# Patient Record
Sex: FEMALE | Race: WHITE | URBAN - METROPOLITAN AREA
[De-identification: names, ages, dates, MRNs, and addresses within clinical notes are randomized per-mention and may not be internally consistent; named-entity substitution may affect disease eponyms.]

---

## 2017-06-30 ENCOUNTER — HISTORICAL (OUTPATIENT)
Dept: LAB | Facility: HOSPITAL | Age: 22
End: 2017-06-30

## 2017-07-21 ENCOUNTER — HOSPITAL ENCOUNTER (OUTPATIENT)
Dept: MEDSURG UNIT | Facility: HOSPITAL | Age: 22
End: 2017-07-22

## 2017-07-21 LAB
ABS NEUT (OLG): 8.43 X10(3)/MCL (ref 2.1–9.2)
ALBUMIN SERPL-MCNC: 3.5 GM/DL (ref 3.4–5)
ALBUMIN/GLOB SERPL: 1 {RATIO}
ALP SERPL-CCNC: 87 UNIT/L (ref 38–126)
ALT SERPL-CCNC: 20 UNIT/L (ref 12–78)
APPEARANCE, UA: CLEAR
AST SERPL-CCNC: 14 UNIT/L (ref 15–37)
BACTERIA SPEC CULT: NORMAL /HPF
BASOPHILS # BLD AUTO: 0.1 X10(3)/MCL (ref 0–0.2)
BASOPHILS NFR BLD AUTO: 0 %
BILIRUB SERPL-MCNC: 0.4 MG/DL (ref 0.2–1)
BILIRUB UR QL STRIP: NEGATIVE
BILIRUBIN DIRECT+TOT PNL SERPL-MCNC: 0.1 MG/DL (ref 0–0.2)
BILIRUBIN DIRECT+TOT PNL SERPL-MCNC: 0.3 MG/DL (ref 0–0.8)
BUN SERPL-MCNC: 10 MG/DL (ref 7–18)
CALCIUM SERPL-MCNC: 8.8 MG/DL (ref 8.5–10.1)
CHLORIDE SERPL-SCNC: 108 MMOL/L (ref 98–107)
CO2 SERPL-SCNC: 22 MMOL/L (ref 21–32)
COLOR UR: YELLOW
CREAT SERPL-MCNC: 0.74 MG/DL (ref 0.55–1.02)
ERYTHROCYTE [DISTWIDTH] IN BLOOD BY AUTOMATED COUNT: 14.9 % (ref 11.5–17)
GLOBULIN SER-MCNC: 3.5 GM/DL (ref 2.4–3.5)
GLUCOSE (UA): NEGATIVE
GLUCOSE SERPL-MCNC: 84 MG/DL (ref 74–106)
HCT VFR BLD AUTO: 35.8 % (ref 37–47)
HGB BLD-MCNC: 11.2 GM/DL (ref 12–16)
HGB UR QL STRIP: NEGATIVE
KETONES UR QL STRIP: NEGATIVE
LEUKOCYTE ESTERASE UR QL STRIP: NEGATIVE
LIPASE SERPL-CCNC: 104 UNIT/L (ref 73–393)
LYMPHOCYTES # BLD AUTO: 2.8 X10(3)/MCL (ref 0.6–4.6)
LYMPHOCYTES NFR BLD AUTO: 23 %
MCH RBC QN AUTO: 23.6 PG (ref 27–31)
MCHC RBC AUTO-ENTMCNC: 31.3 GM/DL (ref 33–36)
MCV RBC AUTO: 75.4 FL (ref 80–94)
MONOCYTES # BLD AUTO: 0.8 X10(3)/MCL (ref 0.1–1.3)
MONOCYTES NFR BLD AUTO: 6 %
NEUTROPHILS # BLD AUTO: 8.43 X10(3)/MCL (ref 1.4–7.9)
NEUTROPHILS NFR BLD AUTO: 70 %
NITRITE UR QL STRIP: NEGATIVE
PH UR STRIP: 5.5 [PH] (ref 5–9)
PLATELET # BLD AUTO: 313 X10(3)/MCL (ref 130–400)
PMV BLD AUTO: 10 FL (ref 9.4–12.4)
POTASSIUM SERPL-SCNC: 3.9 MMOL/L (ref 3.5–5.1)
PROT SERPL-MCNC: 7 GM/DL (ref 6.4–8.2)
PROT UR QL STRIP: NEGATIVE
RBC # BLD AUTO: 4.75 X10(6)/MCL (ref 4.2–5.4)
RBC #/AREA URNS HPF: NORMAL /[HPF]
SODIUM SERPL-SCNC: 140 MMOL/L (ref 136–145)
SP GR UR STRIP: 1.01 (ref 1–1.03)
SQUAMOUS EPITHELIAL, UA: NORMAL
UROBILINOGEN UR STRIP-ACNC: 0.2
WBC # SPEC AUTO: 12.1 X10(3)/MCL (ref 4.5–11.5)
WBC #/AREA URNS HPF: NORMAL /[HPF]

## 2017-07-22 LAB
ABS NEUT (OLG): 7.45 X10(3)/MCL (ref 2.1–9.2)
ALBUMIN SERPL-MCNC: 3.1 GM/DL (ref 3.4–5)
ALBUMIN/GLOB SERPL: 0.8 RATIO (ref 1.1–2)
ALP SERPL-CCNC: 87 UNIT/L (ref 38–126)
ALT SERPL-CCNC: 17 UNIT/L (ref 12–78)
AST SERPL-CCNC: 14 UNIT/L (ref 15–37)
BASOPHILS # BLD AUTO: 0 X10(3)/MCL (ref 0–0.2)
BASOPHILS NFR BLD AUTO: 0 %
BILIRUB SERPL-MCNC: 0.3 MG/DL (ref 0.2–1)
BILIRUBIN DIRECT+TOT PNL SERPL-MCNC: 0.1 MG/DL (ref 0–0.5)
BILIRUBIN DIRECT+TOT PNL SERPL-MCNC: 0.2 MG/DL (ref 0–0.8)
BUN SERPL-MCNC: 7 MG/DL (ref 7–18)
CALCIUM SERPL-MCNC: 8.6 MG/DL (ref 8.5–10.1)
CHLORIDE SERPL-SCNC: 108 MMOL/L (ref 98–107)
CO2 SERPL-SCNC: 21 MMOL/L (ref 21–32)
CREAT SERPL-MCNC: 0.82 MG/DL (ref 0.55–1.02)
ERYTHROCYTE [DISTWIDTH] IN BLOOD BY AUTOMATED COUNT: 15.1 % (ref 11.5–17)
GLOBULIN SER-MCNC: 3.9 GM/DL (ref 2.4–3.5)
GLUCOSE SERPL-MCNC: 129 MG/DL (ref 74–106)
HCT VFR BLD AUTO: 35.7 % (ref 37–47)
HGB BLD-MCNC: 10.8 GM/DL (ref 12–16)
LYMPHOCYTES # BLD AUTO: 1.4 X10(3)/MCL (ref 0.6–4.6)
LYMPHOCYTES NFR BLD AUTO: 14 %
MCH RBC QN AUTO: 23.4 PG (ref 27–31)
MCHC RBC AUTO-ENTMCNC: 30.3 GM/DL (ref 33–36)
MCV RBC AUTO: 77.3 FL (ref 80–94)
MONOCYTES # BLD AUTO: 0.5 X10(3)/MCL (ref 0.1–1.3)
MONOCYTES NFR BLD AUTO: 5 %
NEUTROPHILS # BLD AUTO: 7.45 X10(3)/MCL (ref 2.1–9.2)
NEUTROPHILS NFR BLD AUTO: 80 %
PLATELET # BLD AUTO: 345 X10(3)/MCL (ref 130–400)
PMV BLD AUTO: 10.2 FL (ref 9.4–12.4)
POTASSIUM SERPL-SCNC: 4.1 MMOL/L (ref 3.5–5.1)
PROT SERPL-MCNC: 7 GM/DL (ref 6.4–8.2)
RBC # BLD AUTO: 4.62 X10(6)/MCL (ref 4.2–5.4)
SODIUM SERPL-SCNC: 141 MMOL/L (ref 136–145)
WBC # SPEC AUTO: 9.4 X10(3)/MCL (ref 4.5–11.5)

## 2017-07-27 LAB
FINAL CULTURE: NORMAL
FINAL CULTURE: NORMAL

## 2017-11-15 ENCOUNTER — HISTORICAL (OUTPATIENT)
Dept: LAB | Facility: HOSPITAL | Age: 22
End: 2017-11-15

## 2017-11-15 LAB
AMYLASE SERPL-CCNC: 38 UNIT/L (ref 25–115)
LIPASE SERPL-CCNC: 116 UNIT/L (ref 73–393)

## 2018-09-14 ENCOUNTER — HISTORICAL (OUTPATIENT)
Dept: ADMINISTRATIVE | Facility: HOSPITAL | Age: 23
End: 2018-09-14

## 2018-09-14 LAB
ABS NEUT (OLG): 3.5
ALBUMIN SERPL-MCNC: 4 GM/DL (ref 3.4–5)
ALBUMIN/GLOB SERPL: 1.38 {RATIO} (ref 1.5–2.5)
ALP SERPL-CCNC: 63 UNIT/L (ref 38–126)
ALT SERPL-CCNC: 9 UNIT/L (ref 7–52)
AST SERPL-CCNC: 14 UNIT/L (ref 15–37)
BILIRUB SERPL-MCNC: 0.3 MG/DL (ref 0.2–1)
BILIRUBIN DIRECT+TOT PNL SERPL-MCNC: 0.1 MG/DL (ref 0–0.5)
BILIRUBIN DIRECT+TOT PNL SERPL-MCNC: 0.2 MG/DL
BUN SERPL-MCNC: 11 MG/DL (ref 7–18)
CALCIUM SERPL-MCNC: 9 MG/DL (ref 8.5–10)
CHLORIDE SERPL-SCNC: 106 MMOL/L (ref 98–107)
CHOLEST SERPL-MCNC: 193 MG/DL (ref 0–200)
CHOLEST/HDLC SERPL: 3.9 {RATIO}
CO2 SERPL-SCNC: 23 MMOL/L (ref 21–32)
CREAT SERPL-MCNC: 0.8 MG/DL (ref 0.6–1.3)
ERYTHROCYTE [DISTWIDTH] IN BLOOD BY AUTOMATED COUNT: 12.8 % (ref 11.5–17)
GLOBULIN SER-MCNC: 2.8 GM/DL (ref 1.2–3)
GLUCOSE SERPL-MCNC: 92 MG/DL (ref 74–106)
HCT VFR BLD AUTO: 38.6 % (ref 37–47)
HDLC SERPL-MCNC: 50 MG/DL (ref 35–60)
HGB BLD-MCNC: 12.1 GM/DL (ref 12–16)
LDLC SERPL CALC-MCNC: 148 MG/DL (ref 0–129)
LYMPHOCYTES # BLD AUTO: 3 X10(3)/MCL (ref 0.6–3.4)
LYMPHOCYTES NFR BLD AUTO: 41.6 % (ref 13–40)
MCH RBC QN AUTO: 26.9 PG (ref 27–31.2)
MCHC RBC AUTO-ENTMCNC: 31 GM/DL (ref 32–36)
MCV RBC AUTO: 86 FL (ref 80–94)
MONOCYTES # BLD AUTO: 0.7 X10(3)/MCL (ref 0–1.8)
MONOCYTES NFR BLD AUTO: 9.8 % (ref 0.1–24)
NEUTROPHILS NFR BLD AUTO: 48.6 % (ref 47–80)
PLATELET # BLD AUTO: 340 X10(3)/MCL (ref 130–400)
PMV BLD AUTO: 8.8 FL
POTASSIUM SERPL-SCNC: 4 MMOL/L (ref 3.5–5.1)
PROT SERPL-MCNC: 6.9 GM/DL (ref 6.4–8.2)
RBC # BLD AUTO: 4.5 X10(6)/MCL (ref 4.2–5.4)
SODIUM SERPL-SCNC: 137 MMOL/L (ref 136–145)
TRIGL SERPL-MCNC: 72 MG/DL (ref 30–150)
TSH SERPL-ACNC: 0.94 MIU/ML (ref 0.35–4.94)
VLDLC SERPL CALC-MCNC: 14.4 MG/DL
WBC # SPEC AUTO: 7.2 X10(3)/MCL (ref 4.5–11.5)

## 2019-01-02 ENCOUNTER — HISTORICAL (OUTPATIENT)
Dept: ADMINISTRATIVE | Facility: HOSPITAL | Age: 24
End: 2019-01-02

## 2019-01-02 LAB
ALBUMIN SERPL-MCNC: 4.1 GM/DL (ref 3.4–5)
ALBUMIN/GLOB SERPL: 1.41 {RATIO} (ref 1.5–2.5)
ALP SERPL-CCNC: 70 UNIT/L (ref 38–126)
ALT SERPL-CCNC: 10 UNIT/L (ref 7–52)
AST SERPL-CCNC: 13 UNIT/L (ref 15–37)
BILIRUB SERPL-MCNC: 0.4 MG/DL (ref 0.2–1)
BILIRUBIN DIRECT+TOT PNL SERPL-MCNC: 0.1 MG/DL (ref 0–0.5)
BILIRUBIN DIRECT+TOT PNL SERPL-MCNC: 0.3 MG/DL
BUN SERPL-MCNC: 13 MG/DL (ref 7–18)
CALCIUM SERPL-MCNC: 8.8 MG/DL (ref 8.5–10)
CHLORIDE SERPL-SCNC: 101 MMOL/L (ref 98–107)
CK SERPL-CCNC: 63 UNIT/L (ref 21–232)
CO2 SERPL-SCNC: 26 MMOL/L (ref 21–32)
CREAT SERPL-MCNC: 0.66 MG/DL (ref 0.6–1.3)
GLOBULIN SER-MCNC: 2.9 GM/DL (ref 1.2–3)
GLUCOSE SERPL-MCNC: 94 MG/DL (ref 74–106)
POTASSIUM SERPL-SCNC: 4.1 MMOL/L (ref 3.5–5.1)
PROT SERPL-MCNC: 7 GM/DL (ref 6.4–8.2)
SODIUM SERPL-SCNC: 134 MMOL/L (ref 136–145)

## 2019-06-21 ENCOUNTER — HISTORICAL (OUTPATIENT)
Dept: LAB | Facility: HOSPITAL | Age: 24
End: 2019-06-21

## 2019-06-21 ENCOUNTER — HISTORICAL (OUTPATIENT)
Dept: ADMINISTRATIVE | Facility: HOSPITAL | Age: 24
End: 2019-06-21

## 2019-06-21 LAB
ABS NEUT (OLG): 3 X10(3)/MCL (ref 2.1–9.2)
DEPRECATED CALCIDIOL+CALCIFEROL SERPL-MC: 21.1 NG/ML (ref 30–80)
ERYTHROCYTE [DISTWIDTH] IN BLOOD BY AUTOMATED COUNT: 13 % (ref 11.5–17)
HCT VFR BLD AUTO: 38.3 % (ref 37–47)
HGB BLD-MCNC: 12.5 GM/DL (ref 12–16)
LYMPHOCYTES # BLD AUTO: 3 X10(3)/MCL (ref 0.6–3.4)
LYMPHOCYTES NFR BLD AUTO: 44.9 % (ref 13–40)
MCH RBC QN AUTO: 26.7 PG (ref 27–31.2)
MCHC RBC AUTO-ENTMCNC: 33 GM/DL (ref 32–36)
MCV RBC AUTO: 82 FL (ref 80–94)
MONOCYTES # BLD AUTO: 0.6 X10(3)/MCL (ref 0.1–1.3)
MONOCYTES NFR BLD AUTO: 9.1 % (ref 0.1–24)
NEUTROPHILS NFR BLD AUTO: 46 % (ref 47–80)
PLATELET # BLD AUTO: 326 X10(3)/MCL (ref 130–400)
PMV BLD AUTO: 9.2 FL (ref 9.4–12.4)
RBC # BLD AUTO: 4.68 X10(6)/MCL (ref 4.2–5.4)
VIT B12 SERPL-MCNC: 358 PG/ML (ref 193–986)
WBC # SPEC AUTO: 6.6 X10(3)/MCL (ref 4.5–11.5)

## 2020-01-07 ENCOUNTER — HISTORICAL (OUTPATIENT)
Dept: ADMINISTRATIVE | Facility: HOSPITAL | Age: 25
End: 2020-01-07

## 2020-01-07 LAB
ABS NEUT (OLG): 4.5 X10(3)/MCL (ref 2.1–9.2)
ALBUMIN SERPL-MCNC: 3.9 GM/DL (ref 3.4–5)
ALBUMIN/GLOB SERPL: 1.34 {RATIO} (ref 1.5–2.5)
ALP SERPL-CCNC: 64 UNIT/L (ref 38–126)
ALT SERPL-CCNC: 19 UNIT/L (ref 7–52)
APPEARANCE, UA: CLEAR
AST SERPL-CCNC: 19 UNIT/L (ref 15–37)
BACTERIA #/AREA URNS AUTO: ABNORMAL /HPF
BILIRUB SERPL-MCNC: 0.3 MG/DL (ref 0.2–1)
BILIRUB UR QL STRIP: NEGATIVE MG/DL
BILIRUBIN DIRECT+TOT PNL SERPL-MCNC: 0 MG/DL (ref 0–0.5)
BILIRUBIN DIRECT+TOT PNL SERPL-MCNC: 0.3 MG/DL
BUN SERPL-MCNC: 16 MG/DL (ref 7–18)
CALCIUM SERPL-MCNC: 9 MG/DL (ref 8.5–10)
CHLORIDE SERPL-SCNC: 101 MMOL/L (ref 98–107)
CO2 SERPL-SCNC: 26 MMOL/L (ref 21–32)
COLOR UR: YELLOW
CREAT SERPL-MCNC: 0.73 MG/DL (ref 0.6–1.3)
ERYTHROCYTE [DISTWIDTH] IN BLOOD BY AUTOMATED COUNT: 12.7 % (ref 11.5–17)
GLOBULIN SER-MCNC: 2.9 GM/DL (ref 1.2–3)
GLUCOSE (UA): NEGATIVE MG/DL
GLUCOSE SERPL-MCNC: 94 MG/DL (ref 74–106)
HCT VFR BLD AUTO: 36.9 % (ref 37–47)
HGB BLD-MCNC: 12.4 GM/DL (ref 12–16)
HGB UR QL STRIP: ABNORMAL UNIT/L
KETONES UR QL STRIP: NEGATIVE MG/DL
LEUKOCYTE ESTERASE UR QL STRIP: NEGATIVE UNIT/L
LYMPHOCYTES # BLD AUTO: 3 X10(3)/MCL (ref 0.6–3.4)
LYMPHOCYTES NFR BLD AUTO: 36.8 % (ref 13–40)
MCH RBC QN AUTO: 27.5 PG (ref 27–31.2)
MCHC RBC AUTO-ENTMCNC: 34 GM/DL (ref 32–36)
MCV RBC AUTO: 82 FL (ref 80–94)
MONOCYTES # BLD AUTO: 0.7 X10(3)/MCL (ref 0.1–1.3)
MONOCYTES NFR BLD AUTO: 8 % (ref 0.1–24)
NEUTROPHILS NFR BLD AUTO: 55.2 % (ref 47–80)
NITRITE UR QL STRIP.AUTO: NEGATIVE
PH UR STRIP: 7 [PH]
PLATELET # BLD AUTO: 307 X10(3)/MCL (ref 130–400)
PMV BLD AUTO: 8.8 FL (ref 9.4–12.4)
POTASSIUM SERPL-SCNC: 4.1 MMOL/L (ref 3.5–5.1)
PROT SERPL-MCNC: 6.8 GM/DL (ref 6.4–8.2)
PROT UR QL STRIP: NEGATIVE MG/DL
RBC # BLD AUTO: 4.51 X10(6)/MCL (ref 4.2–5.4)
RBC #/AREA URNS HPF: ABNORMAL /HPF
SODIUM SERPL-SCNC: 134 MMOL/L (ref 136–145)
SP GR UR STRIP: 1.02
SQUAMOUS EPITHELIAL, UA: ABNORMAL /LPF
TSH SERPL-ACNC: 1.23 MIU/ML (ref 0.35–4.94)
UROBILINOGEN UR STRIP-ACNC: 0.2 MG/DL
WBC # SPEC AUTO: 8.2 X10(3)/MCL (ref 4.5–11.5)
WBC #/AREA URNS AUTO: ABNORMAL /[HPF]

## 2021-01-29 ENCOUNTER — HISTORICAL (OUTPATIENT)
Dept: ADMINISTRATIVE | Facility: HOSPITAL | Age: 26
End: 2021-01-29

## 2021-01-29 LAB
ABS NEUT (OLG): 3.8 X10(3)/MCL (ref 2.1–9.2)
ALBUMIN SERPL-MCNC: 4.4 GM/DL (ref 3.4–5)
ALBUMIN/GLOB SERPL: 1.69 {RATIO} (ref 1.5–2.5)
ALP SERPL-CCNC: 71 UNIT/L (ref 38–126)
ALT SERPL-CCNC: 14 UNIT/L (ref 7–52)
AST SERPL-CCNC: 18 UNIT/L (ref 15–37)
BILIRUB SERPL-MCNC: 0.4 MG/DL (ref 0.2–1)
BILIRUBIN DIRECT+TOT PNL SERPL-MCNC: 0.1 MG/DL (ref 0–0.5)
BILIRUBIN DIRECT+TOT PNL SERPL-MCNC: 0.3 MG/DL
BUN SERPL-MCNC: 11 MG/DL (ref 7–18)
CALCIUM SERPL-MCNC: 9.5 MG/DL (ref 8.5–10)
CHLORIDE SERPL-SCNC: 104 MMOL/L (ref 98–107)
CHOLEST SERPL-MCNC: 164 MG/DL (ref 0–200)
CHOLEST/HDLC SERPL: 3.5 {RATIO}
CO2 SERPL-SCNC: 24 MMOL/L (ref 21–32)
CREAT SERPL-MCNC: 0.62 MG/DL (ref 0.6–1.3)
ERYTHROCYTE [DISTWIDTH] IN BLOOD BY AUTOMATED COUNT: 12.1 % (ref 11.5–17)
GLOBULIN SER-MCNC: 2.6 GM/DL (ref 1.2–3)
GLUCOSE SERPL-MCNC: 95 MG/DL (ref 74–106)
HCT VFR BLD AUTO: 38.8 % (ref 37–47)
HDLC SERPL-MCNC: 47 MG/DL (ref 35–60)
HGB BLD-MCNC: 12.8 GM/DL (ref 12–16)
LDLC SERPL CALC-MCNC: 101 MG/DL (ref 0–129)
LYMPHOCYTES # BLD AUTO: 3.3 X10(3)/MCL (ref 0.6–3.4)
LYMPHOCYTES NFR BLD AUTO: 42.7 % (ref 13–40)
MCH RBC QN AUTO: 27.9 PG (ref 27–31.2)
MCHC RBC AUTO-ENTMCNC: 33 GM/DL (ref 32–36)
MCV RBC AUTO: 84 FL (ref 80–94)
MONOCYTES # BLD AUTO: 0.6 X10(3)/MCL (ref 0.1–1.3)
MONOCYTES NFR BLD AUTO: 7.3 % (ref 0.1–24)
NEUTROPHILS NFR BLD AUTO: 50 % (ref 47–80)
PLATELET # BLD AUTO: 350 X10(3)/MCL (ref 130–400)
PMV BLD AUTO: 9.5 FL (ref 9.4–12.4)
POTASSIUM SERPL-SCNC: 4.2 MMOL/L (ref 3.5–5.1)
PROT SERPL-MCNC: 7 GM/DL (ref 6.4–8.2)
RBC # BLD AUTO: 4.59 X10(6)/MCL (ref 4.2–5.4)
SODIUM SERPL-SCNC: 136 MMOL/L (ref 136–145)
TRIGL SERPL-MCNC: 72 MG/DL (ref 30–150)
TSH SERPL-ACNC: 1.16 MIU/ML (ref 0.35–4.94)
VLDLC SERPL CALC-MCNC: 14.4 MG/DL
WBC # SPEC AUTO: 7.7 X10(3)/MCL (ref 4.5–11.5)

## 2022-04-11 ENCOUNTER — HISTORICAL (OUTPATIENT)
Dept: ADMINISTRATIVE | Facility: HOSPITAL | Age: 27
End: 2022-04-11

## 2022-04-28 VITALS
BODY MASS INDEX: 43.67 KG/M2 | DIASTOLIC BLOOD PRESSURE: 74 MMHG | HEIGHT: 65 IN | WEIGHT: 262.13 LBS | SYSTOLIC BLOOD PRESSURE: 122 MMHG

## 2022-04-30 NOTE — OP NOTE
DATE OF SURGERY:    07/21/2017    SURGEON:  Bonifacio Coleman Jr., MD    PREOPERATIVE DIAGNOSIS:  Acute appendicitis.    POSTOPERATIVE DIAGNOSIS:  Acute appendicitis.    PROCEDURE:  Laparoscopic appendectomy.    ASSISTANT:  Sammy Kang M.D.    ANESTHESIA:  General endotracheal.    ESTIMATED BLOOD LOSS:  Less than 20 mL.    FLUID REPLACEMENT:  Please refer to Anesthesia record for volume of crystalloid infused.    SPECIMENS:  Appendix.    COUNTS:  Correct.    COMPLICATIONS:  None apparent.    CONDITION:  The patient transferred to the post-anesthesia care unit in stable condition having tolerated the procedure well.    INDICATION FOR MEDICAL NECESSITY:  The patient is a 21-year-old female who presents with a 1-day history of abdominal pain.  She states the pain was initially poorly localized and then radiated to the right lower quadrant.  She presented for evaluation at [facility] emergency Room.  CT scan was performed which demonstrated inflammation of the appendix and surgical evaluation was requested.  Examination demonstrated focal peritoneal signs at McBurney's point without mass.  The option of laparoscopic versus open appendectomy was reviewed with the patient, we discussed the risks of the procedure including but not limited to bleeding, infection, scarring, damage to the bowel, postoperative abdominal abscess and the possibility of further treatments and procedures.  Her questions were answered.  She appeared to understand our discussion and agreed to proceed with surgery as outlined above.    DESCRIPTION OF OPERATIVE PROCEDURE:  After informed consent was obtained from the patient she was taken to the operating room and placed on the operating table in the supine position.  She underwent general endotracheal anesthesia by the Anesthesiology Department.  The patient's pressure points were padded.  Sequential compression devices were placed on the lower extremities.  A sterilely  inserted Reynolds catheter was placed.  Her arms were tucked to her side and padded.  She was given ciprofloxacin 400 mg preoperatively and Flagyl 500 mg preoperatively.  Her abdomen was then prepped and draped in a standard surgical sterile fashion.  Initially, after appropriate time out was completed, the infraumbilical region was infiltrated with 0.25% Marcaine for postoperative pain control.  A 5 mm incision was made using both sharp and blunt dissection.  The umbilicus was elevated superiorly by way of a penetrating towel clamp.  The Veress needle was placed through the infraumbilical skin wound into the peritoneal cavity and then its placement was checked using the saline drop test.  After adequate placement was documented, pneumoperitoneum was then instituted.  After adequate pneumoperitoneum was achieved the blunt 5 mm trocar was placed through the infraumbilical skin wound into the peritoneal cavity.  Initial laparoscopic visualization did not demonstrate any evidence of complication of port placement.  The remaining port sites were infiltrated with 0.25% Marcaine of which a 5 mm trocar was placed in the suprapubic position and a 12 mm trocar was placed in the left lower quadrant, all under laparoscopic visualization without evidence of complication.  The patient was placed in the Trendelenburg position with left side down for better visualization of the right lower quadrant.  The appendix was noted to be inflamed and thickened with fibrinous exudate.  No perforation or gangrenous changes were noted.  The mesoappendix was dissected into the field and it was ligated using a single fire of the laparoscopic FORD stapler with a vascular load.  The appendix was further dissected until the base was fully visualized.  It was then transected at its base using a single fire of the laparoscopic FORD stapler with a blue tissue load.  The appendix was placed into an endobag and then removed from the patient's abdomen  through the left lower quadrant fascial defect.  The abdomen was irrigated with saline, suctioned dry.  Hemostasis was felt to be adequate after evaluation of both staple lines.  The fascial defect of the left lower quadrant fascial wound was reapproximated using 0 Vicryl by way of the Endoclose suture passer.  All trocars were removed without evidence of bleeding.  The pneumoperitoneum was evacuated.  All skin wounds were reapproximated using 4-0 Monocryl in a subcuticular fashion.  All wounds were cleaned, dried and then sterile bandages were applied.  The patient was then reversed from anesthesia and transferred to the post-anesthesia care unit in stable condition having tolerated the procedure well.  All the needle, instrument and sponge counts were correct at the end of the case.        ______________________________  Bonifacio Coleman Jr., MD    [facility]  DD:  07/21/2017  Time:  05:34PM  DT:  07/23/2017  Time:  05:14PM  Job #:  [id number]

## 2022-04-30 NOTE — ED PROVIDER NOTES
Patient:   Mary Parsons            MRN: [id number]            FIN: [id number]               Age:   21 years     Sex:  Female     :  1995   Associated Diagnoses:   Acute appendicitis; RLQ abdominal pain   Author:   Naren IBARRA, Oswald ALFONSO      Basic Information   Time seen: Date & time 2017 11:16:00.   History source: Patient.   Arrival mode: Private vehicle.   History limitation: None.   Additional information: Patient's physician(s): Rigoberto IBARRA, Rosendo ELENA.      History of Present Illness   The patient presents with 22 y/o cf presents with RLQ pain with nausea since 3 am this morning.  LMP - 17.  Seen at urgent care and sent to ER.  NATALIE Mcnally PA-C and I, Dr. Sneed, assumed care for this patient at 1124.  22 y/o white female presents to ED c/o worsening, sharp RLQ abdominal pain onset 0300 this AM. Per patient, she went to the Urgent Care and they wanted to send her to [facility] but she came here instead due to preference. Patient states she fine all day yesterday and suddenly the pain woke her out of her sleep. Patient reports nausea and last BM yesterday. Patient states she  is currently menstruating. No appetite.  The onset was 2017 03:00:00 .  The course/duration of symptoms is worsening.  The character of symptoms is sharp.  The degree at onset was moderate.  The Location of pain at onset was right, lower and abdominal.  The degree at present is severe.  The Location of pain at present is right, lower and abdominal.  Radiating pain: none. The exacerbating factor is none.  The relieving factor is none.  Therapy today: Urgent Care.  Risk factors consist of obesity.  Associated symptoms: nausea.        Review of Systems   Constitutional symptoms:  Negative except as documented in HPI.   Skin symptoms:  Negative except as documented in HPI.   Eye symptoms:  Negative except as documented in HPI.   ENMT symptoms:  Negative except as documented in HPI.   Respiratory symptoms:  Negative  except as documented in HPI.   Cardiovascular symptoms:  Negative except as documented in HPI.   Gastrointestinal symptoms:  Abdominal pain, right lower quadrant, sharp, pain, nausea, Last bowel movement: Yesterday.    Genitourinary symptoms:  Negative except as documented in HPI.   Musculoskeletal symptoms:  Negative except as documented in HPI.   Neurologic symptoms:  Negative except as documented in HPI.      Health Status   Allergies:    Allergic Reactions (Selected)  Severity Not Documented  Penicillins- No reactions were documented..   Medications:  (Selected)   Documented Medications  Documented  Birth Control Pills: Birth Control Pills, Daily, 0 Refill(s)  Vyvanse 30 mg oral capsule: 30 mg = 1 cap(s), Oral, qAM, 0 Refill(s)  propranolol 10 mg oral tablet: 10 mg = 1 tab(s), Oral, Daily, PRN PRN anxiety.   Menstrual history: Last menstrual period: Date 7/17/2017, regular.      Past Medical/ Family/ Social History   Medical history: HTN.   Surgical history:    tonsilectomy and adenoidectomy., Reviewed as documented in chart.   Family history: Not significant.   Social history: Alcohol use: Occasionally, Tobacco use: Denies, Drug use: Denies.      Physical Examination               Vital Signs   Vital Signs   7/21/2017 11:16 CDT      Temperature Oral          36.9 DegC                             Peripheral Pulse Rate     110 bpm  HI                             Respiratory Rate          20 br/min                             SpO2                      99 %                             Oxygen Therapy            Room air                             Systolic Blood Pressure   154 mmHg  HI                             Diastolic Blood Pressure  81 mmHg  .   Basic Oxygen Information   7/21/2017 11:16 CDT      SpO2                      99 %                             Oxygen Therapy            Room air  .   General:  Alert, mild distress, obese.    Skin:  Warm, dry, intact.    Head:  Normocephalic, atraumatic.    Eye    Cardiovascular:  Regular rate and rhythm, Normal peripheral perfusion, No edema.    Respiratory:  Lungs are clear to auscultation, respirations are non-labored, breath sounds are equal, Symmetrical chest wall expansion.    Gastrointestinal:  Soft, Non distended, Normal bowel sounds, Tenderness: Severe, right lower quadrant, Guarding: Negative, Rebound: Negative.    Musculoskeletal:  No deformity.   Neurological:  Alert and oriented to person, place, time, and situation, No focal neurological deficit observed, normal speech observed.    Psychiatric:  Cooperative, appropriate mood & affect.       Medical Decision Making   Differential Diagnosis:  Abdominal pain, Appendicitis.    Documents reviewed:  Emergency department nurses' notes.   Orders  Laboratory    POC Urine Pregnancy Test ED, Fani Dao, 07/21/17, 11:18, Completed    Automated Diff, Fani Dao, 07/21/17, 11:40, Completed    CBC w/ Auto Diff, Fani Dao, 07/21/17, 11:18, InProcess    CMP, Fani Dao, 07/21/17, 11:18, Ordered    Urinalysis Complete a reflex to culture, Fani Dao, 07/21/17, 11:18, Ordered    Lipase Level, Naren IBARRA, Oswald ALFONSO, 07/21/17, 11:26, Ordered  CT / MRI / Ultrasound    CT Abdomen and Pelvis W Contrast, Naren IBARRA, Oswald ALFONSO, 07/21/17, 11:27, Ordered.   Pregnancy test:  UCG-negative.   Results review:  Lab results : Lab View   7/21/2017 11:49 CDT      U beta hCG Ql POC         Negative    7/21/2017 11:43 CDT      UA Appear                 CLEAR                             UA Color                  YELLOW                             UA Spec Grav              1.013                             UA Bili                   Negative                             UA pH                     5.5                             UA Urobilinogen           0.2                             UA Blood                  Negative                             UA Glucose                Negative                             UA Ketones                 Negative                             UA Protein                Negative                             UA Nitrite                Negative                             UA Leuk Est               Negative                             UA WBC                    NONE SEEN                             UA RBC                    NONE SEEN                             UA Bacteria               NONE SEEN /HPF                             UA Squam Epithelial       NONE SEEN    7/21/2017 11:36 CDT      Sodium Lvl                140 mmol/L                             Potassium Lvl             3.9 mmol/L                             Chloride                  108 mmol/L  HI                             CO2                       22.0 mmol/L                             Calcium Lvl               8.8 mg/dL                             Glucose Lvl               84 mg/dL                             BUN                       10.0 mg/dL                             Creatinine                0.74 mg/dL                             eGFR-AA                   >60 mL/min/1.73 m2  NA                             eGFR-DESMOND                  >60 mL/min/1.73 m2  NA                             Bili Total                0.4 mg/dL                             Bili Direct               0.10 mg/dL                             Bili Indirect             0.30 mg/dL                             AST                       14 unit/L  LOW                             ALT                       20 unit/L                             Alk Phos                  87 unit/L                             Total Protein             7.0 gm/dL                             Albumin Lvl               3.50 gm/dL                             Globulin                  3.50 gm/dL                             A/G Ratio                 1.0  NA                             Lipase Lvl                104 unit/L                             WBC                       12.1 x10(3)/mcL  HI                              RBC                       4.75 x10(6)/mcL                             Hgb                       11.2 gm/dL  LOW                             Hct                       35.8 %  LOW                             Platelet                  313 x10(3)/mcL                             MCV                       75.4 fL  LOW                             MCH                       23.6 pg  LOW                             MCHC                      31.3 gm/dL  LOW                             RDW                       14.9 %                             MPV                       10.0 fL                             Abs Neut                  8.43 x10(3)/mcL                             Neutro Auto               70 %  NA                             Lymph Auto                23 %  NA                             Mono Auto                 6 %  NA                             Basophil Auto             0 %  NA                             Abs Neutro                8.43 x10(3)/mcL  HI                             Abs Lymph                 2.8 x10(3)/mcL                             Abs Mono                  0.8 x10(3)/mcL                             Abs Baso                  0.1 x10(3)/mcL  .   Abdomen/ pelvis CT:  Date/ time reported 7/21/2017 12:38:00, with contrast, interpretation by Radiologist,            * Final Report *    Reason For Exam  appe;Abdominal Pain    Radiology Report     History.  Right flank pain.     Reference.  19 October 2016     Technique.  Helical acquisition through the abdomen and pelvis with IV contrast.  Three plane reconstructions were provided for review. DLP 1120 mGycm.  Automatic exposure control, adjustment of mA/kV or iterative  reconstruction technique was used to reduce radiation.     Findings.  The limited imaged lung bases are clear.      Liver appears normal. Distended gallbladder. No calcified gallstones.  No biliary ductal dilatation. Spleen, pancreas and adrenals are within  normal limits. No  hydronephrosis. Symmetric nephrograms.      No bowel obstruction. Appendix is borderline dilated with  periappendiceal inflammation and hyperemic wall. No perforation. No  drainable abscess.     Urinary bladder is unremarkable. Small amount of free fluid in the  pelvis. No adnexal mass. Abdominal aorta normal in caliber.     No acute osseous findings.      Impression.  Acute appendicitis.     Critical Result: Appendicitis     Results were reported to Dr. Sneed at 1235 on 7/21/2017       Signature Line  Electronically Signed By: Amie IBARRA, David Hunter  Date/Time Signed: 07/21/2017 12:38  .       Reexamination/ Reevaluation   Assessment: cipro, flaygl in ED.      Impression and Plan   Diagnosis   Acute appendicitis (GZZ53-CS K35.80)   RLQ abdominal pain (HVF95-WT R10.31)      Calls-Consults   -  Efren / Jared.   Plan   Condition: Improved, Stable.    Disposition: Admit to Inpatient Unit.    Counseled: Patient, Regarding diagnosis, Regarding diagnostic results, Regarding treatment plan, Patient indicated understanding of instructions.    Notes: I, Erendira Ga, acted solely as a scribe for and in the presence of Dr. Sneed who performed the service.  , I, Dr Sneed have read note from scribe and I agree with history and physical except as amended by me.  All information was dictated from my history and my examination of patient..

## 2022-04-30 NOTE — PROGRESS NOTES
Patient:   Mary Parsons            MRN: [id number]            FIN: [id number]               Age:   21 years     Sex:  Female     :  1995   Associated Diagnoses:   None   Author:   Harper Muniz      20 yo F s/p lap appy here today for routine follow up. She has some burning in her left lateral incision initially, but that has improved significantly.  She is concerned as she is leaving for [place] in about 2 weeks and wants to go hiking.     Gen: NAD  Card: RRR  Pulm: non labored  Abd: soft, NT/ND, incisions healing well    A/P 20 yo s/p lap appy  - no heavy lifting for 2 more weeks  - needs to take it easy while in [place]  - RTC PRN    Harper Hein PA-C

## 2022-05-04 NOTE — HISTORICAL [facility] CERNER
This is a historical note converted from Lori. Formatting and pictures may have been removed.  Please reference Lori for original formatting and attached multimedia. Chief Complaint  c/o sharp RLQ abd pain since 0300, w/ nausea. denies vomitting. sent from Urgent care this am to ER. last BM yesterday.  History of Present Illness  21 y F with acute onset of abd pain at 3 am today.? Associated with mild nausea.? + anorexia.? Feeling well prior.? No sick contacts.? No similar symptoms. Pain localized to RLQ at time of exam.  Review of Systems  as above  Physical Exam  Vitals & Measurements  T:?36.9? ?C ?(Oral)? T:?36.6? ?C ?(Skin)? TMIN:?36.6? ?C ?(Skin)? TMAX:?36.9? ?C ?(Oral)? HR:?81?(Monitored)? BP:?120/61? SpO2:?100%?  AAO NAD  non-toxic  RRR CTA  Abd soft TTP RLQ  + focal peritoneal signs at McBurneys  no diffuse peritoneal signs  Assessment/Plan  21 y F with appendicitis  - admit  - NPO  - IVF/abx  - to OR for lap appy - risks, benefits, alternatives discussed   Problem List/Past Medical History  No chronic problems  Historical  No historical problems  Medications  Inpatient  Buffered Lidocaine 1% - 1mL syringe, 5 mg, 0.5 mL, ID, As Directed, PRN  Dilaudid, 0.2 mg, 0.1 mL, IV Push, q5min, PRN  fentaNYL, 25 mcg, 0.5 mL, IV Push, q5min, PRN  HYDROmorphone, 0.5 mg, 0.25 mL, IV Push, q10min, PRN  Narcan, 0.4 mg, 1 mL, IV Push, Once, PRN  Norco 5 mg-325 mg oral tablet, 1 tab(s), Oral, q6hr, PRN  Norco 7.5 mg-325 mg oral tablet, 1 tab(s), Oral, q4hr, PRN  Plasmalyte 1,000 mL, 1000 mL, IV  Sodium Chloride 0.9% 1,000 mL, 1000 mL, IV  Zofran, 4 mg, 2 mL, IV Push, q4hr, PRN  Zofran 2 mg/mL injectable solution, 4 mg, 2 mL, IV Push, Once, PRN  Home  Birth Control Pills, 1 tab(s), Oral, Daily  Vyvanse 30 mg oral capsule, 30 mg, 1 cap(s), Oral, qAM  Zonegran 100 mg oral capsule, 100 mg, 1 cap(s), Oral, Daily  Allergies  penicillins  Social History  Alcohol - Denies Alcohol Use  Current, 1-2 times per month  Substance Abuse  - Denies Substance Abuse  Never  Tobacco - Denies Tobacco Use  Never smoker  Lab Results  WBC 12.1  Diagnostic Results  CT c/w acute appendicits      As above, RLQ pain, CT c/w appendicitis, ABD exam with peritoneal signs at McBurneys point.? Plan lap appy today.? Questions answered.

## 2022-05-04 NOTE — HISTORICAL [facility] CERNER
This is a historical note converted from Lori. Formatting and pictures may have been removed.  Please reference Lori for original formatting and attached multimedia. Hospital Course  21 y F presented with RLQ pain and n/v.? Work up c/w acute appendicitis.? Pt admitted and taken to the OR on 7/21/17 for lap appy.? Tolerated well.? Transferred back to floor.? Following day was tolerating a diet.? She had good pain control with PO meds and no difficulty voiding.? She was deemed ready for d/c at that time.  Procedures and Treatment Provided  lap appy 7/21/17  Discharge Plan  Abdominal pain  Acute appendicitis  RLQ abdominal pain,?RLQ abdominal pain  Orders:  acetaminophen-HYDROcodone, 1 tab(s), form: Tab, Oral, q4hr PRN for pain, first dose 07/21/17 18:20:00 CDT  Advance Diet as Tolerated, 07/21/17 17:20:00 CDT  Clear Liquid Diet, 07/21/17 17:20:00 CDT, Start Meal: Next Meal  Transfer Inhouse, 07/21/17 17:20:00 CDT, Medical Unit, Yes  Patient Discharge Condition  good  Discharge Disposition  home   I was present with the resident for the history and exam during rounds.  ???  The patients care was discussed as above and I agree with findings and plan as documented in the residents note.  ???  On my exam, doing well, pain LLQ as expected postop.? ABD soft, wally po, pain controlled.? Plan d/c home, po meds, wound care, follow up with surgery clinic in two weeks.? Questions answered, warnings reviewed.